# Patient Record
Sex: FEMALE | Race: WHITE
[De-identification: names, ages, dates, MRNs, and addresses within clinical notes are randomized per-mention and may not be internally consistent; named-entity substitution may affect disease eponyms.]

---

## 2017-08-24 ENCOUNTER — HOSPITAL ENCOUNTER (OUTPATIENT)
Dept: HOSPITAL 63 - US | Age: 62
Discharge: HOME | End: 2017-08-24
Attending: NURSE PRACTITIONER
Payer: OTHER GOVERNMENT

## 2017-08-24 DIAGNOSIS — D25.9: Primary | ICD-10-CM

## 2017-08-24 DIAGNOSIS — R93.8: ICD-10-CM

## 2017-08-24 PROCEDURE — 76830 TRANSVAGINAL US NON-OB: CPT

## 2017-08-24 PROCEDURE — 76856 US EXAM PELVIC COMPLETE: CPT

## 2017-08-24 NOTE — RAD
Pelvic ultrasound



Indication: Pelvic pressure for 2 to 3 weeks.



Technique: Transabdominal and transvaginal ultrasound images of the pelvis.



Comparison: None



Findings:

The uterus measures 11.0 x 6.4 x 4.8 cm with multiple fibroids. The largest

fibroid measures 2.3 x 2.6 x 2.8 cm.

The endometrial stripe measures 7 mm and is abnormal.

There is a hypoechoic lesion within the lower endometrial canal with

vascularity likely submucosal fibroid or polyp.

The right ovary measures 2.3 x 1.3 x 1.6 cm and is within normal limits. Left

ovary not visualized.

Small amount of fluid is seen in the cervical canal.



Impression:

1. Fibroid uterus.

2. Small hypoechoic lesion within the lower endometrial cavity may represent a

submucosal fibroid or endometrial polyp.

3. Thickened endometrial stripe (7 mm), abnormal in postmenopausal state. 

Tissue sampling recommended to rule out endometrial malignancy.

4. Small amount of fluid in the endocervical canal, nonspecific. Clinically

correlate for any obstructing cervical lesion.

## 2017-10-23 ENCOUNTER — HOSPITAL ENCOUNTER (OUTPATIENT)
Dept: HOSPITAL 63 - MAMMO | Age: 62
Discharge: HOME | End: 2017-10-23
Payer: OTHER GOVERNMENT

## 2017-10-23 DIAGNOSIS — Z12.31: Primary | ICD-10-CM

## 2017-10-23 PROCEDURE — 77063 BREAST TOMOSYNTHESIS BI: CPT

## 2017-10-23 NOTE — RAD
EXAM: MAMMO PADMAJA SCREENING BILATERAL



HISTORY: Routine Screening.



COMPARISON: 10/21/2016



Standard mammographic views are obtained of the bilateral breasts.

Additionally three-dimensional tomographic images obtained.

This study was interpreted with the benefit of Computerized Aided Detection

(CAD).



FINDINGS:



The breast parenchyma is dense, which could reduce the sensitivity of

mammography.  Breast parenchyma level density D.. 



There is no definite new suspicious spiculated mass or worrisome new cluster

of microcalcifications.  







IMPRESSION: No definite new suspicious mass.







BI-RADS CATEGORY: 1 NEGATIVE



RECOMMENDED FOLLOW-UP: 12M 12 MONTH FOLLOW-UP



PQRS compliance statement: Patient information was entered into a reminder

system with a target due date for the next mammogram.



Mammography is a sensitive method for finding small breast cancers, but it

does not detect them all and is not a substitute for careful clinical

examination.  A negative mammogram does not negate a clinically suspicious

finding and should not result in delay in biopsying a clinically suspicious

abnormality.



"Our facility is accredited by the American [employer] Mammography

Program."

## 2017-11-10 VITALS — SYSTOLIC BLOOD PRESSURE: 101 MMHG | DIASTOLIC BLOOD PRESSURE: 58 MMHG

## 2018-10-24 ENCOUNTER — HOSPITAL ENCOUNTER (OUTPATIENT)
Dept: HOSPITAL 63 - MAMMO | Age: 63
Discharge: HOME | End: 2018-10-24
Attending: PHYSICIAN ASSISTANT
Payer: COMMERCIAL

## 2018-10-24 DIAGNOSIS — Z12.31: Primary | ICD-10-CM

## 2018-10-24 PROCEDURE — 77067 SCR MAMMO BI INCL CAD: CPT

## 2018-10-24 PROCEDURE — 77063 BREAST TOMOSYNTHESIS BI: CPT

## 2018-10-25 NOTE — RAD
DATE: 10/24/2018



EXAM: MAMMO PADMAJA SCREENING BILATERAL



HISTORY: Routine screening



COMPARISON: 10/23/2017



This study was interpreted with the benefit of Computerized Aided Detection

(CAD).





Breast Density: DENSE The breast parenchyma is dense, which could reduce the

sensitivity of mammography. Breast parenchyma level density D.





FINDINGS: 2-D and 3-D tomosynthesis imaging was performed in CC and MLO

projections.  No new or enlarging breast densities are seen.  No suspicious

microcalcifications are evident.  





IMPRESSION: Stable mammograms without evidence of malignancy.







BI-RADS CATEGORY: 1 NEGATIVE



RECOMMENDED FOLLOW-UP: 12M 12 MONTH FOLLOW-UP



PQRS compliance statement: Patient information was entered into a reminder

system with a target due date     for the next mammogram.



Mammography is a sensitive method for finding small breast cancers, but it

does not detect them all and is not a substitute for careful clinical

examination.  A negative mammogram does not negate a clinically suspicious

finding and should not result in delay in biopsying a clinically suspicious

abnormality.



"Our facility is accredited by the American [employer] Mammography

Program."

## 2019-10-25 ENCOUNTER — HOSPITAL ENCOUNTER (OUTPATIENT)
Dept: HOSPITAL 63 - MAMMO | Age: 64
Discharge: HOME | End: 2019-10-25
Attending: PHYSICIAN ASSISTANT
Payer: OTHER GOVERNMENT

## 2019-10-25 DIAGNOSIS — Z12.31: Primary | ICD-10-CM

## 2019-10-25 PROCEDURE — 77067 SCR MAMMO BI INCL CAD: CPT

## 2019-10-25 PROCEDURE — 77063 BREAST TOMOSYNTHESIS BI: CPT

## 2019-10-28 NOTE — RAD
DATE: 10/25/2019 9:00 AM



EXAM: MAMMO PADMAJA SCREENING BILATERAL



HISTORY: Screening mammogram.



COMPARISON: October 24, 2018.



Bilateral CC and MLO views of the breasts were performed. Bilateral breast

tomosynthesis was performed in CC and MLO projections.



This study was interpreted with the benefit of Computerized Aided Detection

(CAD).





FINDINGS:



Breast Density: DENSE  The breast Parenchyma is dense, which could reduce the

sensitivity of mammography. Breast parenchyma level density D.





No suspicious masses, microcalcifications or architectural distortion is

present to suggest malignancy in either breast.





The visualized axillae are unremarkable. 



IMPRESSION: No mammographic evidence of malignancy. 



BI-RADS CATEGORY: 1 NEGATIVE



RECOMMENDED FOLLOW-UP: 12M 12 MONTH FOLLOW-UP Annual screening mammography is

recommended, unless clinically indicated sooner based on symptoms or change in

physical exam.



PQRS compliance statement: Patient information was entered into a reminder

system with a target due date one year for the next mammogram.



Mammography is a sensitive method for finding small breast cancers, but it

does not detect them all and is not a substitute for careful clinical

examination.  A negative mammogram does not negate a clinically suspicious

finding and should not result in delay in biopsying a clinically suspicious

abnormality.



"Our facility is accredited by the American [employer] Mammography

Program."

## 2020-10-28 ENCOUNTER — HOSPITAL ENCOUNTER (OUTPATIENT)
Dept: HOSPITAL 63 - MAMMO | Age: 65
End: 2020-10-28
Attending: PHYSICIAN ASSISTANT
Payer: MEDICARE

## 2020-10-28 DIAGNOSIS — Z12.31: Primary | ICD-10-CM

## 2020-10-28 PROCEDURE — 77063 BREAST TOMOSYNTHESIS BI: CPT

## 2020-10-28 PROCEDURE — 77067 SCR MAMMO BI INCL CAD: CPT

## 2020-10-28 NOTE — RAD
BILATERAL SCREENING MAMMOGRAM, 3-D

 

History: Routine screening.

 

Comparison:  10/25/2019, 10/24/2018, 10/23/2017, 10/21/2016. 

 

Technique:  MLO and CC digital tomosynthesis (3D) images obtained. 

Radiologist reviewed these images on dedicated workstation.

 

Findings: 

Breast Tissue Density D :The breasts are extremely dense, which lowers the

sensitivity of mammography. No

 

There are no dominant masses, suspicious microcalcifications, or 

architectural distortion.

 

IMPRESSION:

No mammographic evidence of malignancy. Recommend routine screening.

 

BI-RADS category 1:  Negative.

 

The images were reviewed with computer-aided detection.

 

Patient information is entered into reminder system with a target due date

for the next screening mammogram.

 

Mammography is the most sensitive method for finding small breast cancers,

but it does not detect them all and is not a substitute for careful 

clinical examination. A negative mammogram does not negate a clinically 

suspicious finding and should not result in delay in biopsying a 

clinically suspicious abnormality.

 

 "Our facility is accredited by the American [employer] 

Mammography Program."

 

Electronically signed by: Rishi Cummings MD (10/28/2020 4:59 PM) [id number]

## 2021-11-15 ENCOUNTER — HOSPITAL ENCOUNTER (OUTPATIENT)
Dept: HOSPITAL 63 - MAMMO | Age: 66
End: 2021-11-15
Attending: PHYSICIAN ASSISTANT
Payer: MEDICARE

## 2021-11-15 DIAGNOSIS — Z12.31: Primary | ICD-10-CM

## 2021-11-15 PROCEDURE — 77063 BREAST TOMOSYNTHESIS BI: CPT

## 2021-11-15 PROCEDURE — 77067 SCR MAMMO BI INCL CAD: CPT

## 2021-11-15 NOTE — RAD
BILATERAL DIGITAL SCREENING 2-D AND 3-D MAMMOGRAM



INDICATION: Routine screening. 



COMPARISON:  10/28/2020, 10/25/2019, 10/24/2018



Interpretation was made using CAD.



FINDINGS:

Breast Density: The breasts are extremely dense, which lowers the sensitivity of mammography. 



RIGHT BREAST: No suspicious masses, calcifications or areas of architectural distortion are seen.



LEFT BREAST: There is a circumscribed asymmetry in the slightly upper inner breast seen in the MLO vi
ew only, approximately 4 mm diameter, 2 cm from the nipple. Additionally small group of indeterminate
 calcifications lower left breast posterior third seen in the MLO view only.



IMPRESSION:

1.  Left breast asymmetry and indeterminate calcifications. No evidence of line seen in the right angelina
ast.



ASSESSMENT: BI-RADS 0: Incomplete -- Need Additional Imaging Evaluation and/or Prior Mammograms for C
omparison.



RECOMMENDATION: Diagnostic left mammogram with spot compression and magnification views. Left breast 
ultrasound if indicated.





The facility will notify the patient of the results via mail. Patient information will be entered int
o the mammography reminder system with a target recall date for the next mammogram. A reminder letter
 will be generated by the facility.





Electronically signed by: Johnathon Mojica MD (11/15/2021 4:10 PM) [id number]

## 2021-12-06 ENCOUNTER — HOSPITAL ENCOUNTER (OUTPATIENT)
Dept: HOSPITAL 63 - MAMMO | Age: 66
End: 2021-12-06
Attending: PHYSICIAN ASSISTANT
Payer: MEDICARE

## 2021-12-06 DIAGNOSIS — N63.22: Primary | ICD-10-CM

## 2021-12-06 DIAGNOSIS — R92.2: ICD-10-CM

## 2021-12-06 PROCEDURE — 76641 ULTRASOUND BREAST COMPLETE: CPT

## 2021-12-06 PROCEDURE — 77065 DX MAMMO INCL CAD UNI: CPT

## 2021-12-06 NOTE — RAD
EXAMINATION:  US BREAST LT, MG DIAGNOSTICUNILAT MAMMO



History: 66-year-old woman recalled from left screening mammogram for left breast asymmetry and indet
erminate calcifications.



Comparison:  Screening mammogram 11/15/2021 and other prior exams. 



Technique: Diagnostic left breast mammogram was performed with spot magnification CC and ML views and
 full field MLO view. Subsequent left breast ultrasound was performed in the area of concern.





Findings: 

Breast Tissue Density C : The breasts are heterogeneously dense, which may obscure small masses.



The cluster of calcifications in the posterior left breast 4.5 cm posterior to the nipple have puncta
te round morphology. These are along the posterior nipple line on MLO view, likely at 9:00.



Ultrasound of the left breast was performed from 11:00 to 2:00 left breast 2 cm the nipple. There is 
a 6 x 4 x 4 mm ovoid hypoechoic circumscribed mass at 11:00 3 cm the nipple, likely corresponding wit
h the nodule seen on mammogram. Also noted is a 1.2 x 1.1 x 0.5 cm macrolobulated mass at 2:00 3 cm t
he nipple. This is parallel in orientation with no posterior acoustic features. No suspicious lymph n
odes in the axilla.





IMPRESSION:



1. Suspicious left breast calcifications. Recommend stereotactic guided biopsy.



2. Probably benign left breast masses at 11:00 3 cm from the nipple and 2:00 3 cm from the nipple. Th
e mass at 11:00 corresponds with the asymmetry seen on mammogram. Recommend 6 month follow-up ultraso
und of these masses to ensure stability.



BI-RADS Category 4:  Suspicious.



Electronically signed by: Silvia James MD (12/6/2021 5:18 PM) [id number]

## 2021-12-28 ENCOUNTER — HOSPITAL ENCOUNTER (OUTPATIENT)
Dept: HOSPITAL 61 - US | Age: 66
Discharge: HOME | End: 2021-12-28
Attending: SURGERY
Payer: MEDICARE

## 2021-12-28 DIAGNOSIS — E03.9: ICD-10-CM

## 2021-12-28 DIAGNOSIS — Z98.890: ICD-10-CM

## 2021-12-28 DIAGNOSIS — D24.2: ICD-10-CM

## 2021-12-28 DIAGNOSIS — R92.8: ICD-10-CM

## 2021-12-28 DIAGNOSIS — Z79.899: ICD-10-CM

## 2021-12-28 DIAGNOSIS — N60.82: ICD-10-CM

## 2021-12-28 DIAGNOSIS — R92.0: Primary | ICD-10-CM

## 2021-12-28 DIAGNOSIS — N60.42: ICD-10-CM

## 2021-12-28 DIAGNOSIS — N60.02: ICD-10-CM

## 2021-12-28 DIAGNOSIS — Z98.51: ICD-10-CM

## 2021-12-28 DIAGNOSIS — Z88.2: ICD-10-CM

## 2021-12-28 DIAGNOSIS — N60.12: ICD-10-CM

## 2021-12-28 PROCEDURE — 19081 BX BREAST 1ST LESION STRTCTC: CPT

## 2021-12-28 PROCEDURE — 77065 DX MAMMO INCL CAD UNI: CPT

## 2021-12-28 PROCEDURE — 88305 TISSUE EXAM BY PATHOLOGIST: CPT

## 2021-12-28 PROCEDURE — 76641 ULTRASOUND BREAST COMPLETE: CPT

## 2021-12-28 NOTE — RAD
EXAM: 

1. STEREOTACTICALLY GUIDED VACUUM ASSISTED LEFT BREAST CORE BIOPSY.

2. POSTCLIP DIGITAL LEFT MAMMOGRAPHY.

3. SPECIMEN RADIOGRAPH.

4. LEFT BREAST ULTRASOUND.



HISTORY: Left breast masses on prior ultrasound. Left breast microcalcifications. Stereotactic biopsy
 is requested.



FINDINGS: Sonography of the left breast was performed and compared with the prior study of 12/06/2021
. A circumscribed hypoechoic nodule at the 11:00 periareolar position corresponds with the lesion not
ed at 11:00 3 cm from the nipple previously and measures 6 x 4 mm. This may correspond with the mammo
graphic finding. This appears benign. The other hypoechoic region at the 2:00 position is most likely
 normal fat interspersed between dense parenchyma. No sonographic target for biopsy is identified.



Stereotactic biopsy and its risks and benefits were discussed with the patient. Risks discussed inclu
ded, but were not limited to, pain, infection, bleeding and the potential need for repeat biopsy. The
 patient provided verbal and written consent. A timeout procedure was performed.



The target calcifications posteriorly were localized stereotactically from a lateral approach. The ti
ny calcifications were difficult to visualize superimposed on dense parenchyma. The overlying skin wa
s sterilely prepped and infiltrated with 1% lidocaine for local anesthesia. The deeper soft tissues w
ere infiltrated with 2% lidocaine with epinephrine for anesthesia and hemostasis. A vacuum-assisted c
ore biopsy system was advanced to the target under stereotactic guidance. 14 core biopsy specimens we
re obtained. A specimen radiograph demonstrates a few tiny calcifications within the specimens. A pos
tbiopsy clip was placed and postoperative images were obtained. Instrumentation was withdrawn and pre
ssure held and hemostasis. A sterile dressing was placed. There were no immediate complications.



Postclip digital left mammography was obtained in CC and MLO projections and interpreted on a [employer]
ed workstation. The the targeted calcifications are difficult to identify in the CC projection, and t
hus the suggested cluster seen on the MLO view may be simulated by superimposition. The postbiopsy cl
ip but appears to 8 mm superior to the main cluster, and is just lateral to the nipple line. A questi
onable group of calcifications is seen much more medially on the CC projection, but these appear more
 scattered. Other scattered calcifications are seen very laterally.



IMPRESSION:

1. The target calcifications on the MLO projection were difficult to visualize stereotactically, and 
this cluster may be simulated by superimposition. The postbiopsy clip does not clearly correspond wit
h the suggested site medially on the CC projection, but is within 1 cm of the calcifications on the M
L projection. It is likely that the entire medial-lateral dimension of the target zone was sampled, a
s the length of the trough encompassed almost the entire compressed width of the breast. However, con
cordance with the target is not certain. It is unclear that localization would be more successful on 
a second attempt. Six-month follow-up mammography and ongoing attention to these calcifications on fu
rther mammography is recommended. These findings were discussed with the patient. Pathological result
s will be reviewed and an addendum issued.

2. A few calcifications are included on the specimen radiograph.

3. Six-month follow-up left breast ultrasound is recommended to demonstrate stability of small periar
eolar hypoechoic foci as above.



Electronically signed by: LUIS Eric MD (12/28/2021 2:05 PM) [id number]

## 2021-12-28 NOTE — RAD
EXAM: 

1. STEREOTACTICALLY GUIDED VACUUM ASSISTED LEFT BREAST CORE BIOPSY.

2. POSTCLIP DIGITAL LEFT MAMMOGRAPHY.

3. SPECIMEN RADIOGRAPH.

4. LEFT BREAST ULTRASOUND.



HISTORY: Left breast masses on prior ultrasound. Left breast microcalcifications. Stereotactic biopsy
 is requested.



FINDINGS: Sonography of the left breast was performed and compared with the prior study of 12/06/2021
. A circumscribed hypoechoic nodule at the 11:00 periareolar position corresponds with the lesion not
ed at 11:00 3 cm from the nipple previously and measures 6 x 4 mm. This may correspond with the mammo
graphic finding. This appears benign. The other hypoechoic region at the 2:00 position is most likely
 normal fat interspersed between dense parenchyma. No sonographic target for biopsy is identified.



Stereotactic biopsy and its risks and benefits were discussed with the patient. Risks discussed inclu
ded, but were not limited to, pain, infection, bleeding and the potential need for repeat biopsy. The
 patient provided verbal and written consent. A timeout procedure was performed.



The target calcifications posteriorly were localized stereotactically from a lateral approach. The ti
ny calcifications were difficult to visualize superimposed on dense parenchyma. The overlying skin wa
s sterilely prepped and infiltrated with 1% lidocaine for local anesthesia. The deeper soft tissues w
ere infiltrated with 2% lidocaine with epinephrine for anesthesia and hemostasis. A vacuum-assisted c
ore biopsy system was advanced to the target under stereotactic guidance. 14 core biopsy specimens we
re obtained. A specimen radiograph demonstrates a few tiny calcifications within the specimens. A pos
tbiopsy clip was placed and postoperative images were obtained. Instrumentation was withdrawn and pre
ssure held and hemostasis. A sterile dressing was placed. There were no immediate complications.



Postclip digital left mammography was obtained in CC and MLO projections and interpreted on a [employer]
ed workstation. The the targeted calcifications are difficult to identify in the CC projection, and t
hus the suggested cluster seen on the MLO view may be simulated by superimposition. The postbiopsy cl
ip but appears to 8 mm superior to the main cluster, and is just lateral to the nipple line. A questi
onable group of calcifications is seen much more medially on the CC projection, but these appear more
 scattered. Other scattered calcifications are seen very laterally.



IMPRESSION:

1. The target calcifications on the MLO projection were difficult to visualize stereotactically, and 
this cluster may be simulated by superimposition. The postbiopsy clip does not clearly correspond wit
h the suggested site medially on the CC projection, but is within 1 cm of the calcifications on the M
L projection. It is likely that the entire medial-lateral dimension of the target zone was sampled, a
s the length of the trough encompassed almost the entire compressed width of the breast. However, con
cordance with the target is not certain. It is unclear that localization would be more successful on 
a second attempt. Six-month follow-up mammography and ongoing attention to these calcifications on fu
rther mammography is recommended. These findings were discussed with the patient. Pathological result
s will be reviewed and an addendum issued.

2. A few calcifications are included on the specimen radiograph.

3. Six-month follow-up left breast ultrasound is recommended to demonstrate stability of small periar
eolar hypoechoic foci as above.



Electronically signed by: LUIS Eric MD (12/28/2021 2:05 PM) [id number]

## 2021-12-28 NOTE — RAD
EXAM: 

1. STEREOTACTICALLY GUIDED VACUUM ASSISTED LEFT BREAST CORE BIOPSY.

2. POSTCLIP DIGITAL LEFT MAMMOGRAPHY.

3. SPECIMEN RADIOGRAPH.

4. LEFT BREAST ULTRASOUND.



HISTORY: Left breast masses on prior ultrasound. Left breast microcalcifications. Stereotactic biopsy
 is requested.



FINDINGS: Sonography of the left breast was performed and compared with the prior study of 12/06/2021
. A circumscribed hypoechoic nodule at the 11:00 periareolar position corresponds with the lesion not
ed at 11:00 3 cm from the nipple previously and measures 6 x 4 mm. This may correspond with the mammo
graphic finding. This appears benign. The other hypoechoic region at the 2:00 position is most likely
 normal fat interspersed between dense parenchyma. No sonographic target for biopsy is identified.



Stereotactic biopsy and its risks and benefits were discussed with the patient. Risks discussed inclu
ded, but were not limited to, pain, infection, bleeding and the potential need for repeat biopsy. The
 patient provided verbal and written consent. A timeout procedure was performed.



The target calcifications posteriorly were localized stereotactically from a lateral approach. The ti
ny calcifications were difficult to visualize superimposed on dense parenchyma. The overlying skin wa
s sterilely prepped and infiltrated with 1% lidocaine for local anesthesia. The deeper soft tissues w
ere infiltrated with 2% lidocaine with epinephrine for anesthesia and hemostasis. A vacuum-assisted c
ore biopsy system was advanced to the target under stereotactic guidance. 14 core biopsy specimens we
re obtained. A specimen radiograph demonstrates a few tiny calcifications within the specimens. A pos
tbiopsy clip was placed and postoperative images were obtained. Instrumentation was withdrawn and pre
ssure held and hemostasis. A sterile dressing was placed. There were no immediate complications.



Postclip digital left mammography was obtained in CC and MLO projections and interpreted on a [employer]
ed workstation. The the targeted calcifications are difficult to identify in the CC projection, and t
hus the suggested cluster seen on the MLO view may be simulated by superimposition. The postbiopsy cl
ip but appears to 8 mm superior to the main cluster, and is just lateral to the nipple line. A questi
onable group of calcifications is seen much more medially on the CC projection, but these appear more
 scattered. Other scattered calcifications are seen very laterally.



IMPRESSION:

1. The target calcifications on the MLO projection were difficult to visualize stereotactically, and 
this cluster may be simulated by superimposition. The postbiopsy clip does not clearly correspond wit
h the suggested site medially on the CC projection, but is within 1 cm of the calcifications on the M
L projection. It is likely that the entire medial-lateral dimension of the target zone was sampled, a
s the length of the trough encompassed almost the entire compressed width of the breast. However, con
cordance with the target is not certain. It is unclear that localization would be more successful on 
a second attempt. Six-month follow-up mammography and ongoing attention to these calcifications on fu
rther mammography is recommended. These findings were discussed with the patient. Pathological result
s will be reviewed and an addendum issued.

2. A few calcifications are included on the specimen radiograph.

3. Six-month follow-up left breast ultrasound is recommended to demonstrate stability of small periar
eolar hypoechoic foci as above.



Electronically signed by: LUIS Eric MD (12/28/2021 2:05 PM) [id number]

## 2021-12-29 NOTE — PATHOLOGY
[facility] Accession Number: [id number]

.                                                                01

Material submitted:                                        .

breast - LEFT BREAST TISSUE. Modifiers: left

.                                                                01

Clinical history:                                          .

LEFT BREAST CALCIFICATIONS

LEFT BREAST STEREOTACTIC BIOPSY

.                                                                02

**********************************************************************

Diagnosis:

Breast tissue, left breast stereotactic biopsies:

- Intraductal papillomas with florid ductal epithelial hyperplasia and

focal apocrine metaplasia.

- Proliferative fibrocystic changes with the following components:

  - Florid ductal epithelial hyperplasia, focal.

  - Stromal fibrosis.

  - Mild duct ectasia, focal.

  - Microcysts, few.

  - Apocrine metaplasia, focal.

- Fibroadenomatous change, focal.

- Focal microcalcifications identified.

(JPM:daniel; 12/29/2021)

S  12/29/2021  1724 Local

**********************************************************************

.                                                                02

Comment:

There are focal microcalcifications identified.  These are primarily

associated with intraductal papillomas.  There is no atypia or evidence of

malignancy.

(JPM:daniel; 12/29/2021)

.                                                                02

Electronically signed:                                     .

Glenn Pham MD, Pathologist

NPI- [id number]

.                                                                01

Gross description:                                         .

The specimen is received in formalin, labeled "Sujata Ventura, left

breast". Received are 21 needle cores of fibrofatty tissue inside a

cassette and with no free floating tissue within the specimen container

measuring 4.2 x 1.8 x 0.2 cm in aggregate dimensions. The specimen is

submitted entirely in cassettes A1 to A6. The specimen is collected at

12:35 PM and placed into formalin at 12:48 PM on 12/28/2021. The specimen

is removed from formalin at 9:40 PM on 12/28/2021. The total formalin

fixation time is 9 hours and 5 minutes.([facility]; 12/28/2021)

[facility]  12/28/2021  [address] [facility]

.                                                                02

Pathologist provided ICD-10:

D24.2, N62, N60.82, N60.12, N60.32, N60.42, N60.02

.                                                                02

CPT                                                        .

102166

Specimen Comment: A courtesy copy of this report has been sent to [phone], [phone]

Specimen Comment: [id number], [phone]

Specimen Comment: Report sent to , DR SHINE / DR GUSTAFSON

Specimen Comment: A duplicate report has been generated due to demographic updates.

***Performed at:  01

   [facility]

   [address], [place]  [id number]

   MD Thomas Singh MD Phone:  [phone]

***Performed at:  02

   [facility]

   [address], [place]  [id number]

   MD Glenn Pham MD Phone:  [phone]

## 2022-04-20 ENCOUNTER — HOSPITAL ENCOUNTER (OUTPATIENT)
Dept: HOSPITAL 63 - LAB | Age: 67
End: 2022-04-20
Attending: PHYSICIAN ASSISTANT
Payer: MEDICARE

## 2022-04-20 DIAGNOSIS — B35.1: Primary | ICD-10-CM

## 2022-04-20 LAB
ALBUMIN SERPL-MCNC: 3.4 G/DL (ref 3.4–5)
ALP SERPL-CCNC: 54 U/L (ref 46–116)
ALT SERPL-CCNC: 25 U/L (ref 14–59)
AST SERPL-CCNC: 21 U/L (ref 15–37)
BILIRUB DIRECT SERPL-MCNC: 0.1 MG/DL (ref 0–0.2)
BILIRUB SERPL-MCNC: 0.5 MG/DL (ref 0.2–1)
PROT SERPL-MCNC: 6.4 G/DL (ref 6.4–8.2)

## 2022-04-20 PROCEDURE — 80076 HEPATIC FUNCTION PANEL: CPT

## 2022-04-20 PROCEDURE — 36415 COLL VENOUS BLD VENIPUNCTURE: CPT
